# Patient Record
Sex: FEMALE | Race: OTHER | HISPANIC OR LATINO
[De-identification: names, ages, dates, MRNs, and addresses within clinical notes are randomized per-mention and may not be internally consistent; named-entity substitution may affect disease eponyms.]

---

## 2021-06-22 ENCOUNTER — NON-APPOINTMENT (OUTPATIENT)
Age: 56
End: 2021-06-22

## 2021-06-28 PROBLEM — Z00.00 ENCOUNTER FOR PREVENTIVE HEALTH EXAMINATION: Status: ACTIVE | Noted: 2021-06-28

## 2021-06-29 ENCOUNTER — APPOINTMENT (OUTPATIENT)
Dept: ORTHOPEDIC SURGERY | Facility: CLINIC | Age: 56
End: 2021-06-29
Payer: COMMERCIAL

## 2021-06-29 PROCEDURE — 73130 X-RAY EXAM OF HAND: CPT | Mod: LT

## 2021-06-29 PROCEDURE — 99204 OFFICE O/P NEW MOD 45 MIN: CPT

## 2021-06-29 PROCEDURE — 99072 ADDL SUPL MATRL&STAF TM PHE: CPT

## 2021-06-29 NOTE — REASON FOR VISIT
[Initial Visit] : an initial visit for [Family Member] : family member [FreeTextEntry2] : left hand pain
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
## 2021-06-29 NOTE — RETURN TO WORK/SCHOOL
[FreeTextEntry1] : Vivien was seen today for evaluation of left hand fracture.  She is unable to perform her work duties at this time.  Please excuse her from work until reevaluated in 2 weeks.  This note expires on 7/14/2021.\par Thank you for your understanding.\par \par Sincerely,\par \par Aldair Davis DO, ATC\par Primary Care Sports Medicine\par [facility] [place]\par

## 2021-06-29 NOTE — PHYSICAL EXAM
[de-identified] : Constitutional: Well-nourished, well-developed, No acute distress\par Respiratory:  Good respiratory effort, no SOB\par Psychiatric: Pleasant and normal affect, alert and oriented x3\par Musculoskeletal: normal except where as noted in regional exam\par \par \par Left Hand:\par APPEARANCE:  + swelling over dorsum and lateral portion of hand, no marked deformities or malalignment of the fingers\par POSITIVE TENDERNESS: + 5th metacarpal and mild over 4th metacarpal\par NONTENDER: all other osseous and ligamentous structures. \par ROM: Limited ROM of the 4th/5th digits secondary to pain and stiffness, otherwise full & painless in CMC, MCP, and IP joints. \par RESISTIVE TESTING: 3/5 of the involved digit, otherwise painless resisted testing. \par SPECIAL TESTS: normal sensation of 1st through 5th digits, normal flex/ext, abd/add, and opposition of thumb, no triggering of fingers\par Vasc: 2+ radial pulse, normal capillary refill\par Neuro: AIN, PIN, Ulnar nerve intact to motor\par Sensation: Intact to light touch throughout\par \par \par  [de-identified] : \par The following radiographs were ordered and read by me during this patient's visit. I reviewed each radiograph in detail with the patient and discussed the findings as highlighted below. \par \par 3 views of the left hand were obtained today that show a nondisplaced oblique fifth metacarpal shaft fracture.  There is no malalignment. There is no joint dislocation, or degenerative changes seen. No other obvious osseous abnormality. Otherwise unremarkable.
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
## 2021-07-13 ENCOUNTER — APPOINTMENT (OUTPATIENT)
Dept: ORTHOPEDIC SURGERY | Facility: CLINIC | Age: 56
End: 2021-07-13
Payer: COMMERCIAL

## 2021-07-13 DIAGNOSIS — S62.357D NONDISPLACED FRACTURE OF SHAFT OF FIFTH METACARPAL BONE, LEFT HAND, SUBSEQUENT ENCOUNTER FOR FRACTURE WITH ROUTINE HEALING: ICD-10-CM

## 2021-07-13 DIAGNOSIS — S62.357A NONDISPLACED FRACTURE OF SHAFT OF FIFTH METACARPAL BONE, LEFT HAND, INITIAL ENCOUNTER FOR CLOSED FRACTURE: ICD-10-CM

## 2021-07-13 PROCEDURE — 99213 OFFICE O/P EST LOW 20 MIN: CPT

## 2021-07-13 PROCEDURE — 99072 ADDL SUPL MATRL&STAF TM PHE: CPT

## 2021-07-13 PROCEDURE — 73130 X-RAY EXAM OF HAND: CPT | Mod: LT

## 2021-07-13 NOTE — DISCUSSION/SUMMARY
[de-identified] : Patient was seen today for reevaluation of left hand fifth metacarpal fracture.  At this time patient has routine healing of her fracture, she has significantly reduced swelling and pain.  She has some mild stiffness due to recent immobilization, but is showing very good signs of clinical healing.  At this time recommend discontinuation of ulnar gutter splint, and resumption of ADLs and activities as tolerated.  Based on clinical exam it does not appear that patient will need any formal hand therapy.  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  Patient states she is scheduled to be going away on vacation in August, I advised the patient I cannot excuse her from work until the end of her vacation, based on my evaluation today patient would be cleared for full work duty without restrictions as of 8/2/2021.  If patient feels she cannot return to work at that time she will need to follow-up in the office for reassessment.  Patient and adult grandchild (acting as  to the patient's primary language of Irish) appreciate and agree with current plan.\par \par This note was generated using dragon medical dictation software.  A reasonable effort has been made for proofreading its contents, but typos may still remain.  If there are any questions or points of clarification needed please notify my office.

## 2021-07-13 NOTE — RETURN TO WORK/SCHOOL
[FreeTextEntry1] : Vivien was seen today for reevaluation of left hand fracture.  Please excuse her from work until 8/2/2021 at which time she is cleared for full work duty without restrictions.\par Thank you for your understanding.\par \par Sincerely,\par \par Aldair Davis DO, ATC\par Primary Care Sports Medicine\par [facility] [place]\par
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
## 2021-07-13 NOTE — PHYSICAL EXAM
[de-identified] : Constitutional: Well-nourished, well-developed, No acute distress\par Respiratory:  Good respiratory effort, no SOB\par Psychiatric: Pleasant and normal affect, alert and oriented x3\par Musculoskeletal: normal except where as noted in regional exam\par \par \par Left Hand:\par APPEARANCE: Minimal swelling over lateral portion of hand, no marked deformities or malalignment of the fingers\par POSITIVE TENDERNESS: + Mild at 5th metacarpal shaft and minimal over 4th metacarpal\par NONTENDER: all other osseous and ligamentous structures. \par ROM: Mildly Limited ROM of the 4th/5th digits secondary to stiffness, otherwise full & painless in CMC, MCP, and IP joints. \par RESISTIVE TESTIN/5 of the involved digit, otherwise painless resisted testing. \par SPECIAL TESTS: normal sensation of 1st through 5th digits, normal flex/ext, abd/add, and opposition of thumb, no triggering of fingers\par  [de-identified] : The following radiographs were ordered and read by me during this patient's visit. I reviewed each radiograph in detail with the patient and discussed the findings as highlighted below. \par \par 3 views of the left hand were obtained today that show a nondisplaced 5th metacarpal shaft fracture.  There is no malalignment. There is no joint dislocation, or degenerative changes seen. No other obvious osseous abnormality. Otherwise unremarkable.\par